# Patient Record
Sex: FEMALE | Race: WHITE | URBAN - METROPOLITAN AREA
[De-identification: names, ages, dates, MRNs, and addresses within clinical notes are randomized per-mention and may not be internally consistent; named-entity substitution may affect disease eponyms.]

---

## 2019-06-12 ENCOUNTER — TELEPHONE (OUTPATIENT)
Dept: NEUROLOGY | Age: 74
End: 2019-06-12

## 2019-06-28 ENCOUNTER — OFFICE VISIT (OUTPATIENT)
Dept: NEUROLOGY | Age: 74
End: 2019-06-28

## 2019-06-28 VITALS
OXYGEN SATURATION: 98 % | BODY MASS INDEX: 25.23 KG/M2 | HEIGHT: 66 IN | HEART RATE: 78 BPM | WEIGHT: 157 LBS | SYSTOLIC BLOOD PRESSURE: 130 MMHG | DIASTOLIC BLOOD PRESSURE: 78 MMHG

## 2019-06-28 DIAGNOSIS — G44.209 ACUTE NON INTRACTABLE TENSION-TYPE HEADACHE: Primary | ICD-10-CM

## 2019-06-28 RX ORDER — METFORMIN HYDROCHLORIDE 500 MG/1
TABLET ORAL 2 TIMES DAILY WITH MEALS
COMMUNITY

## 2019-06-28 RX ORDER — TIMOLOL MALEATE 5 MG/ML
1 SOLUTION/ DROPS OPHTHALMIC 2 TIMES DAILY
COMMUNITY

## 2019-06-28 RX ORDER — ROSUVASTATIN CALCIUM 10 MG/1
10 TABLET, COATED ORAL
COMMUNITY

## 2019-06-28 RX ORDER — ALPRAZOLAM 0.5 MG/1
0.5 TABLET ORAL AS NEEDED
COMMUNITY

## 2019-06-28 RX ORDER — NAPROXEN 500 MG/1
500 TABLET ORAL AS NEEDED
COMMUNITY

## 2019-06-28 RX ORDER — CHROMIUM PICOLINATE 200 MCG
TABLET ORAL
COMMUNITY

## 2019-06-28 RX ORDER — LANOLIN ALCOHOL/MO/W.PET/CERES
1000 CREAM (GRAM) TOPICAL DAILY
COMMUNITY

## 2019-06-28 RX ORDER — ASPIRIN 81 MG/1
TABLET ORAL DAILY
COMMUNITY

## 2019-06-28 RX ORDER — BISMUTH SUBSALICYLATE 262 MG
1 TABLET,CHEWABLE ORAL DAILY
COMMUNITY

## 2019-06-28 RX ORDER — TRAZODONE HYDROCHLORIDE 100 MG/1
100 TABLET ORAL
COMMUNITY

## 2019-06-28 RX ORDER — LOSARTAN POTASSIUM 50 MG/1
TABLET ORAL DAILY
COMMUNITY

## 2019-06-28 NOTE — PATIENT INSTRUCTIONS

## 2019-06-28 NOTE — PROGRESS NOTES
Neurology Note    Chief Complaint   Patient presents with    Follow-up     headache       HPI/Subjective  Aleida Miner is a 72 y.o. female who presented to the neurology office for management of headaches. She started having headaches since April 2019. The headaches are getting better with time. The headaches are frontal in location, 2/10 in severity, mild nausea and no vomiting. No photo or phonophobia. The headaches last for around 30 minutes. No aggravating factors. She takes tylenol for the headaches. She does get a headache every day. The headaches are \"heavy and clogging\" pressure headache. She does also have sinusitis. She does also have acute bronchitis and is on antibiotics and that is helping her. She did have MRI brain and C spine but the results are not available. Current Outpatient Medications   Medication Sig    bimatoprost (LUMIGAN) 0.01 % ophthalmic drops Administer 1 Drop to both eyes every evening.  timolol (TIMOPTIC) 0.5 % ophthalmic solution 1 Drop two (2) times a day.  losartan (COZAAR) 50 mg tablet Take  by mouth daily.  traZODone (DESYREL) 100 mg tablet Take 100 mg by mouth nightly.  ALPRAZolam (XANAX) 0.5 mg tablet Take 0.5 mg by mouth as needed for Anxiety.  rosuvastatin (CRESTOR) 10 mg tablet Take 10 mg by mouth nightly.  naproxen (NAPROSYN) 500 mg tablet Take 500 mg by mouth as needed.  ferrous sulfate (IRON) 325 mg (65 mg iron) cpER Take  by mouth.  aspirin delayed-release 81 mg tablet Take  by mouth daily.  Calcium-Cholecalciferol, D3, 600 mg(1,500mg) -400 unit cap Take  by mouth.  cyanocobalamin 1,000 mcg tablet Take 1,000 mcg by mouth daily.  multivitamin (ONE A DAY) tablet Take 1 Tab by mouth daily.  metFORMIN (GLUCOPHAGE) 500 mg tablet Take  by mouth two (2) times daily (with meals). No current facility-administered medications for this visit.       Allergies no known allergies  Past Medical History:   Diagnosis Date    Diabetes ([facility] [address]     Essential hypertension      No past surgical history on file. Family History   Problem Relation Age of Onset    Heart Disease Father     Cancer Father      Social History     Tobacco Use    Smoking status: Not on file   Substance Use Topics    Alcohol use: Not on file    Drug use: Not on file       REVIEW OF SYSTEMS:   A ten system review of constitutional, cardiovascular, respiratory, musculoskeletal, endocrine, skin, SHEENT, genitourinary, psychiatric and neurologic systems was obtained and is unremarkable with the exception of Anxiety, coughing, fatigue, frequent headache, hearing loss, joint pain, muscle pain, muscle weakness and snoring     EXAMINATION:   Visit Vitals  /78   Pulse 78   Ht 5' 6\" (1.676 m)   Wt 157 lb (71.2 kg)   SpO2 98%   BMI 25.34 kg/m²        General:   General appearance: Pt is in no acute distress   Distal pulses are preserved  Fundoscopic Exam: Normal    Neurological Examination:   Mental Status: AAO x3. Speech is fluent. Follows commands, has normal fund of knowledge, attention, short term recall, comprehension and insight. Cranial Nerves: Visual fields are full. PERRL, Extraocular movements are full. Facial sensation intact. Facial movement intact. Hearing intact to conversation. Palate elevates symmetrically. Shoulder shrug symmetric. Tongue midline. Motor: Strength is 5/5 in all 4 ext. No atrophy. Tone: Normal    Sensation: Light touch - Normal    Reflexes: DTRs 2+ throughout. Coordination/Cerebellar: Intact to finger-nose-finger     Gait: Casual gait is normal.     Skin: No significant bruising or lacerations. Laboratory review:   No results found for this or any previous visit. Imaging review:  6/5/2019  MRI brain  Normal    MRI cervical spine  Normal  Reviewed images      Documentation review:  None    Assessment/Plan:   Juan Lr is a 72 y.o. female who presented to the neurology office for management of headaches. Patient has been dealing with headaches for the last 2 months. Examination is normal.  I suspect these are tension type headaches but could also be secondary to her sinusitis. She is on antibiotics. She had imaging of the brain but the result is not available to me. The images that she brought with her were not adequate to assess her brain. I will get the result of the MRI of the brain. I do plan to see the patient back in 6 weeks and if she still has persistent headaches, we will start her on low-dose amitriptyline. She states that she had small vessel disease on MRI of the brain. No treatment needed for that. Follow-up in 6 weeks    No flowsheet data found. Primary care to address possible depression if PHQ-9 score is more than 9. ICD-10-CM ICD-9-CM    1. Acute non intractable tension-type headache G44.209 339.10       Thank you for allowing me to participate in the care of Ms. Valentino Palma. Please feel free to contact me if you have any questions. Taryn Massey MD  Neurologist    CC: Emanuel Quintanilla MD  Fax: [phone]    This note was created using voice recognition software. Despite editing, there may be syntax errors.

## 2019-06-28 NOTE — LETTER
6/28/19 Patient: Joseph Torres YOB: 1945 Date of Visit: 6/28/2019 Francine Varner MD 
[address] [place] [id number] VIA Facsimile: [phone] Dear Francine Varner MD, Thank you for referring Ms. Ilana Brand to [address] [place] [address] for evaluation. My notes for this consultation are attached. If you have questions, please do not hesitate to call me. I look forward to following your patient along with you. Sincerely, Leandro Baez MD

## 2019-07-09 ENCOUNTER — DOCUMENTATION ONLY (OUTPATIENT)
Dept: NEUROLOGY | Age: 74
End: 2019-07-09